# Patient Record
Sex: MALE | Race: WHITE
[De-identification: names, ages, dates, MRNs, and addresses within clinical notes are randomized per-mention and may not be internally consistent; named-entity substitution may affect disease eponyms.]

---

## 2018-02-16 ENCOUNTER — HOSPITAL ENCOUNTER (INPATIENT)
Dept: HOSPITAL 74 - YASAS | Age: 58
LOS: 3 days | Discharge: LEFT BEFORE DISCHARGE | DRG: 894 | End: 2018-02-19
Attending: INTERNAL MEDICINE | Admitting: INTERNAL MEDICINE
Payer: COMMERCIAL

## 2018-02-16 VITALS — BODY MASS INDEX: 18.4 KG/M2

## 2018-02-16 DIAGNOSIS — R63.6: ICD-10-CM

## 2018-02-16 DIAGNOSIS — Z21: ICD-10-CM

## 2018-02-16 DIAGNOSIS — F11.20: ICD-10-CM

## 2018-02-16 DIAGNOSIS — F10.230: Primary | ICD-10-CM

## 2018-02-16 DIAGNOSIS — B18.2: ICD-10-CM

## 2018-02-16 DIAGNOSIS — F12.20: ICD-10-CM

## 2018-02-16 DIAGNOSIS — F17.210: ICD-10-CM

## 2018-02-16 LAB
APPEARANCE UR: CLEAR
BILIRUB UR STRIP.AUTO-MCNC: NEGATIVE MG/DL
COLOR UR: YELLOW
KETONES UR QL STRIP: NEGATIVE
LEUKOCYTE ESTERASE UR QL STRIP.AUTO: NEGATIVE
NITRITE UR QL STRIP: NEGATIVE
PH UR: 5 [PH] (ref 5–8)
PROT UR QL STRIP: NEGATIVE
PROT UR QL STRIP: NEGATIVE
RBC # UR STRIP: NEGATIVE /UL
SP GR UR: 1.02 (ref 1–1.03)
UROBILINOGEN UR STRIP-MCNC: NEGATIVE MG/DL (ref 0.2–1)

## 2018-02-16 PROCEDURE — HZ2ZZZZ DETOXIFICATION SERVICES FOR SUBSTANCE ABUSE TREATMENT: ICD-10-PCS | Performed by: SURGERY

## 2018-02-16 RX ADMIN — Medication SCH MG: at 22:13

## 2018-02-16 RX ADMIN — HYDROXYZINE PAMOATE PRN MG: 50 CAPSULE ORAL at 22:16

## 2018-02-16 NOTE — HP
CIWA Score





- CIWA Score


Nausea/Vomitin-Mild Nausea/No Vomiting


Muscle Tremors: 2


Anxiety: 2


Agitation: 1-Slight > Activity


Paroxysmal Sweats: 2


Orientation: 2-Disoriented Date<2 days


Tacttile Disturbances: 1-Very Mild Itch/Numbness


Auditory Disturbances: 0-None


Visual Disturbances: 0-None


Headache: 2-Mild


CIWA-Ar Total Score: 13





Admission ROS BHS





- HPI


Chief Complaint: 





WITHDRAWAL SYMPTOMS 


Allergies/Adverse Reactions: 


 Allergies











Allergy/AdvReac Type Severity Reaction Status Date / Time


 


No Known Allergies Allergy   Verified 18 15:47











History of Present Illness: 





57 Y.O. MAN WITH A HISTORY OF ALCOHOL DEPENDENCE IS HERE DETOX.  HE REPORTS HE 

COMPLETED DETOX 6 MONTHS AGO AT [facility].  HE GOES TO [facility] MMTP AND 

REPORTS LDM ON 18 WITH 55MG OF METHADONE.  DOES NOT HAVE A SIGNIFICANT 

PERIOD OF SOBRIETY.  


Exam Limitations: No Limitations





- Ebola screening


Have you traveled outside of the country in the last 21 days: No


Have you had contact with anyone from an Ebola affected area: No


Have you been sick,other than usual withdrawal symptoms: No





- Review of Systems


Constitutional: Chills, Diaphoresis, Loss of Appetite, Unintentional Wgt. Loss


EENT: reports: Blurred Vision


Respiratory: reports: Shortness of Breath


Cardiac: reports: No Symptoms Reported


GI: reports: No Symptoms Reported


: reports: No Symptoms Reported


Musculoskeletal: reports: Back Pain


Integumentary: reports: No Symptoms Reported


Neuro: reports: Headache, Tingling


Endocrine: reports: No Symptoms Reported


Hematology: reports: No Symptoms Reported


Psychiatric: reports: Judgement Intact, Mood/Affect Appropiate


Other Systems: Reviewed and Negative





Patient History





- Patient Medical History


Hx Anemia: No


Hx Asthma: No


Hx Chronic Obstructive Pulmonary Disease (COPD): No


Hx Cancer: No


Hx Cardiac Disorders: No


Hx Congestive Heart Failure: No


Hx Hypertension: No


Hx Hypercholesterolemia: No


Hx Pacemaker: No


HX Cerebrovascular Accident: No


Hx Seizures: No


Hx Dementia: No


Hx Diabetes: No


Hx Gastrointestinal Disorders: No


Hx Liver Disease: Yes (HCV )


Hx Genitourinary Disorders: No


Hx Sexually Transmitted Disorders: No


Hx Renal Disease (ESRD): No


Hx Thyroid Disease: No


Hx Human Immunodeficiency Virus (HIV): Yes (DX )


Hx Hepatitis C: Yes (UNTREATED)


Hx Depression: No


Hx Suicide Attempt: No


Hx Bipolar Disorder: No


Hx Schizophrenia: No





- Patient Surgical History


Past Surgical History: No





- PPD History


Previous Implant?: Yes


Documented Results: Negative w/o proof


Implanted On Prior R Admission?: No


PPD to be Administered?: Yes





- Reproductive History


Patient is a Female of Child Bearing Age (11 -55 yrs old): No





- Smoking Cessation


Smoking history: Current every day smoker


Have you smoked in the past 12 months: Yes


Aproximately how many cigarettes per day: 10


Hx Chewing Tobacco Use: No


Initiated information on smoking cessation: Yes


'Breaking Loose' booklet given: 18





- Substance & Tx. History


Hx Alcohol Use: Yes


Hx Substance Use: Yes


Substance Use Type: Alcohol, Cocaine, Marijuana


Hx Substance Use Treatment: Yes (DETOX: 2017 AT [facility] )





- Substances Abused


  ** Alcohol


Route: Oral


Frequency: Daily


Amount used: 1 PINT VODKA


Age of first use: 12


Date of Last Use: 18





  ** Cocaine


Route: Injection


Frequency: Daily


Amount used: $20-40


Age of first use: 19


Date of Last Use: 02/15/18





  ** Marijuana/Hashish


Route: Smoking


Frequency: Daily


Amount used: $5


Age of first use: 12


Date of Last Use: 02/15/18





Family Disease History





- Family Disease History


Family Disease History: Diabetes: Mother, Heart Disease: Father





Admission Physical Exam BHS





- Vital Signs


Vital Signs: 


 Vital Signs - 24 hr











  18





  14:16


 


Temperature 96 F L


 


Pulse Rate 66


 


Respiratory 20





Rate 


 


Blood Pressure 132/74














- Physical


General Appearance: Yes: Thin, Sweating, Anxious


HEENTM: Yes: Hearing grossly Normal, Normal ENT Inspection, Normocephalic, 

Normal Voice


Respiratory: Yes: Chest Non-Tender, Lungs Clear, Normal Breath Sounds, No 

Respiratory Distress, No Accessory Muscle Use


Neck: Yes: No masses,lesions,Nodules, Trachea in good position


Breast: Yes: Breast Exam Deferred


Cardiology: Yes: Regular Rhythm, Regular Rate


Abdominal: Yes: Normal Bowel Sounds, Non Tender, Flat, Soft


Genitourinary: Yes: Within Normal Limits (NO COMPLAINTS REPORTED)


Back: Yes: Normal Inspection


Musculoskeletal: Yes: Gait Steady, Pelvis Stable


Extremities: Yes: Normal Capillary Refill, Normal Inspection, Normal Range of 

Motion, Non-Tender


Neurological: Yes: CNs II-XII NML intact, Alert, Normal Response


Integumentary: Yes: Normal Color, Dry, Warm, Track Marks


Lymphatic: Yes: Within Normal Limits





- Diagnostic


(1) HIV disease


Current Visit: Yes   Status: Chronic   





(2) Alcohol dependence with uncomplicated withdrawal


Current Visit: Yes   Status: Chronic   





(3) Nicotine dependence


Current Visit: Yes   Status: Chronic   





(4) Underweight


Current Visit: Yes   Status: Chronic   





(5) Opioid dependence on agonist therapy


Current Visit: Yes   Status: Chronic   





(6) HCV (hepatitis C virus)


Current Visit: Yes   Status: Chronic   





Cleared for Admission [facility]





- Detox or Rehab


[facility] Level of Care: Medically Managed


Detox Regimen/Protocol: Librium





BHS Breath Alcohol Content


Breath Alcohol Content: 0





Urine Drug Screen





- Results


Drug Screen Negative: No


Urine Drug Screen Results: THC-Marijuana, JEFFERY-Cocaine, MTD-Methadone

## 2018-02-17 LAB
ALBUMIN SERPL-MCNC: 3 G/DL (ref 3.4–5)
ALP SERPL-CCNC: 85 U/L (ref 45–117)
ALT SERPL-CCNC: 20 U/L (ref 12–78)
ANION GAP SERPL CALC-SCNC: 4 MMOL/L (ref 8–16)
AST SERPL-CCNC: 21 U/L (ref 15–37)
BILIRUB SERPL-MCNC: 0.4 MG/DL (ref 0.2–1)
BUN SERPL-MCNC: 17 MG/DL (ref 7–18)
CALCIUM SERPL-MCNC: 8 MG/DL (ref 8.5–10.1)
CHLORIDE SERPL-SCNC: 107 MMOL/L (ref 98–107)
CO2 SERPL-SCNC: 28 MMOL/L (ref 21–32)
CREAT SERPL-MCNC: 0.8 MG/DL (ref 0.7–1.3)
DEPRECATED RDW RBC AUTO: 13.9 % (ref 11.9–15.9)
GLUCOSE SERPL-MCNC: 98 MG/DL (ref 74–106)
HCT VFR BLD CALC: 36.1 % (ref 35.4–49)
HGB BLD-MCNC: 12.1 GM/DL (ref 11.7–16.9)
MCH RBC QN AUTO: 29.8 PG (ref 25.7–33.7)
MCHC RBC AUTO-ENTMCNC: 33.5 G/DL (ref 32–35.9)
MCV RBC: 88.8 FL (ref 80–96)
PLATELET # BLD AUTO: 140 K/MM3 (ref 134–434)
PMV BLD: 8.6 FL (ref 7.5–11.1)
POTASSIUM SERPLBLD-SCNC: 3.8 MMOL/L (ref 3.5–5.1)
PROT SERPL-MCNC: 7 G/DL (ref 6.4–8.2)
RBC # BLD AUTO: 4.07 M/MM3 (ref 4–5.6)
SODIUM SERPL-SCNC: 139 MMOL/L (ref 136–145)
WBC # BLD AUTO: 4.8 K/MM3 (ref 4–10)

## 2018-02-17 RX ADMIN — Medication SCH: at 23:02

## 2018-02-17 RX ADMIN — NICOTINE SCH MG: 14 PATCH, EXTENDED RELEASE TRANSDERMAL at 10:30

## 2018-02-17 RX ADMIN — Medication SCH TAB: at 10:30

## 2018-02-17 NOTE — PN
[facility] CIWA





- CIWA Score


Nausea/Vomitin-No Nausea/No Vomiting


Muscle Tremors: 3


Anxiety: 4-Mod. Anxious/Guarded


Agitation: 2


Paroxysmal Sweats: 3


Orientation: 2-Disoriented Date<2 days


Tacttile Disturbances: 0-None


Auditory Disturbances: 0-None


Visual Disturbances: 3-Moderate Sensitivity


Headache: 0-None Present


CIWA-Ar Total Score: 17





BHS Progress Note (SOAP)


Subjective: 





Diarrhea, Stomach Cramping, Body Aches, Tremors, Sweating.


Objective: 


PT. A & O X 2 (UNCERTAIN ABOUT CURRENT DAY / DATE). PT. OBSERVED AMBULATING ON 

UNIT. NO ACUTE DISTRESS.





18 16:53


 Vital Signs











Temperature  98.3 F   18 14:40


 


Pulse Rate  74   18 14:40


 


Respiratory Rate  20   18 14:40


 


Blood Pressure  119/71   18 14:40


 


O2 Sat by Pulse Oximetry (%)      








 Laboratory Tests











  18





  21:20 07:50 07:50


 


WBC   4.8 


 


RBC   4.07 


 


Hgb   12.1 


 


Hct   36.1 


 


MCV   88.8 


 


MCH   29.8 


 


MCHC   33.5 


 


RDW   13.9 


 


Plt Count   140 


 


MPV   8.6 


 


Sodium    139


 


Potassium    3.8


 


Chloride    107


 


Carbon Dioxide    28


 


Anion Gap    4 L


 


BUN    17


 


Creatinine    0.8


 


Creat Clearance w eGFR    > 60


 


Random Glucose    98


 


Calcium    8.0 L


 


Total Bilirubin    0.4


 


AST    21


 


ALT    20


 


Alkaline Phosphatase    85


 


Total Protein    7.0


 


Albumin    3.0 L


 


Urine Color  Yellow  


 


Urine Appearance  Clear  


 


Urine pH  5.0  


 


Ur Specific Gravity  1.018  


 


Urine Protein  Negative  


 


Urine Glucose (UA)  Negative  


 


Urine Ketones  Negative  


 


Urine Blood  Negative  


 


Urine Nitrite  Negative  


 


Urine Bilirubin  Negative  


 


Urine Urobilinogen  Negative  


 


Ur Leukocyte Esterase  Negative  


 


RPR Titer   














  18





  07:50


 


WBC 


 


RBC 


 


Hgb 


 


Hct 


 


MCV 


 


MCH 


 


MCHC 


 


RDW 


 


Plt Count 


 


MPV 


 


Sodium 


 


Potassium 


 


Chloride 


 


Carbon Dioxide 


 


Anion Gap 


 


BUN 


 


Creatinine 


 


Creat Clearance w eGFR 


 


Random Glucose 


 


Calcium 


 


Total Bilirubin 


 


AST 


 


ALT 


 


Alkaline Phosphatase 


 


Total Protein 


 


Albumin 


 


Urine Color 


 


Urine Appearance 


 


Urine pH 


 


Ur Specific Gravity 


 


Urine Protein 


 


Urine Glucose (UA) 


 


Urine Ketones 


 


Urine Blood 


 


Urine Nitrite 


 


Urine Bilirubin 


 


Urine Urobilinogen 


 


Ur Leukocyte Esterase 


 


RPR Titer  Nonreactive








LABS NOTED.


Assessment: 





18 16:54


WITHDRAWAL SYMPTOMS.


Plan: 





CONTINUE DETOX.


PRN IMMODIUM FOR DIARRHEA.


INCREASE DAILY PO FLUID INTAKE.

## 2018-02-18 RX ADMIN — Medication SCH MG: at 22:49

## 2018-02-18 RX ADMIN — LOPERAMIDE HYDROCHLORIDE PRN MG: 2 CAPSULE ORAL at 06:40

## 2018-02-18 RX ADMIN — NICOTINE SCH MG: 14 PATCH, EXTENDED RELEASE TRANSDERMAL at 10:21

## 2018-02-18 RX ADMIN — Medication SCH TAB: at 10:21

## 2018-02-18 RX ADMIN — HYDROXYZINE PAMOATE PRN MG: 50 CAPSULE ORAL at 22:52

## 2018-02-18 RX ADMIN — LOPERAMIDE HYDROCHLORIDE PRN MG: 2 CAPSULE ORAL at 15:33

## 2018-02-18 NOTE — EKG
Test Reason : 

Blood Pressure : ***/*** mmHG

Vent. Rate : 067 BPM     Atrial Rate : 067 BPM

   P-R Int : 174 ms          QRS Dur : 098 ms

    QT Int : 458 ms       P-R-T Axes : 084 108 061 degrees

   QTc Int : 483 ms

 

NORMAL SINUS RHYTHM

RIGHTWARD AXIS

PULMONARY DISEASE PATTERN

PROLONGED QT

ABNORMAL ECG

NO PREVIOUS ECGS AVAILABLE

Confirmed by RAFIQ NAVARRO MD ([id number]) on 2/18/2018 11:11:15 AM

 

Referred By:             Confirmed By:RAFIQ NAVARRO MD

## 2018-02-18 NOTE — PN
BHS Progress Note


Note: 





methadone 30 mg po x 1 dose now pending mmtp dose verification on 2/19/18.


utox + mtd

## 2018-02-18 NOTE — PN
BHS Progress Note (SOAP)


Subjective: 





ALERT,IRRITABLE,ANXIOUS,INTERRUPTED SLEEP


Objective: 





02/18/18 16:25


 Vital Signs











Temperature  97.3 F L  02/18/18 13:49


 


Pulse Rate  77   02/18/18 13:49


 


Respiratory Rate  18   02/18/18 13:49


 


Blood Pressure  128/73   02/18/18 13:49


 


O2 Sat by Pulse Oximetry (%)      











Assessment: 





02/18/18 16:25


WITHDRAWAL SYMPTOM


Plan: 





CONTINUE DETOX,PATIENT STATED HE IS ON MMTP,NO VERIFICATION,TO VERIFY PATIENT 

METHADONE PROGRAM IN AM

## 2018-02-19 VITALS — DIASTOLIC BLOOD PRESSURE: 71 MMHG | HEART RATE: 52 BPM | TEMPERATURE: 97 F | SYSTOLIC BLOOD PRESSURE: 107 MMHG

## 2018-02-19 RX ADMIN — Medication SCH: at 11:10

## 2018-02-19 RX ADMIN — NICOTINE SCH: 14 PATCH, EXTENDED RELEASE TRANSDERMAL at 11:10

## 2018-02-19 NOTE — DS
BHS Detox Discharge Summary


Admission Date: 


02/16/18





Discharge Date: 02/19/18





- History


Present History: Alcohol Dependence, Opioid Dependence, MMTP


Additional Comments: 





PATIENT DOES NOT WISH TO STAY TO COMPLETE DETOX REGIMEN. RISKS OF LEAVING DETOX 

UNIT AGAINST MEDICAL ADVICE AND PRIOR TO COMPLETION OF DETOX REGIMEN EXPLAINED 

TO PATIENT. PATIENT ADVISED TO GO IMMEDIATELY TO NEAREST ER SHOULD ANY 

INTOLERABLE DETOX SYMPTOMS DEVELOP AT ANY TIME. PATIENT LEFT DETOX UNIT IN 

STABLE MEDICAL CONDITION.


Pertinent Past History: 





MMTP, Nicotine Dependence, Underweight, Hep C, HIV.





- Physical Exam Results


Vital Signs: 


 Vital Signs











Temperature  98.6 F   02/19/18 14:08


 


Pulse Rate  74   02/19/18 14:08


 


Respiratory Rate  16   02/19/18 14:08


 


Blood Pressure  119/78   02/19/18 14:08


 


O2 Sat by Pulse Oximetry (%)      











Pertinent Admission Physical Exam Findings: 





WITHDRAWAL SYMPTOMS.





 Laboratory Tests











  02/16/18 02/17/18 02/17/18





  21:20 07:50 07:50


 


WBC   4.8 


 


RBC   4.07 


 


Hgb   12.1 


 


Hct   36.1 


 


MCV   88.8 


 


MCH   29.8 


 


MCHC   33.5 


 


RDW   13.9 


 


Plt Count   140 


 


MPV   8.6 


 


Sodium    139


 


Potassium    3.8


 


Chloride    107


 


Carbon Dioxide    28


 


Anion Gap    4 L


 


BUN    17


 


Creatinine    0.8


 


Creat Clearance w eGFR    > 60


 


Random Glucose    98


 


Calcium    8.0 L


 


Total Bilirubin    0.4


 


AST    21


 


ALT    20


 


Alkaline Phosphatase    85


 


Total Protein    7.0


 


Albumin    3.0 L


 


Urine Color  Yellow  


 


Urine Appearance  Clear  


 


Urine pH  5.0  


 


Ur Specific Gravity  1.018  


 


Urine Protein  Negative  


 


Urine Glucose (UA)  Negative  


 


Urine Ketones  Negative  


 


Urine Blood  Negative  


 


Urine Nitrite  Negative  


 


Urine Bilirubin  Negative  


 


Urine Urobilinogen  Negative  


 


Ur Leukocyte Esterase  Negative  


 


RPR Titer   














  02/17/18





  07:50


 


WBC 


 


RBC 


 


Hgb 


 


Hct 


 


MCV 


 


MCH 


 


MCHC 


 


RDW 


 


Plt Count 


 


MPV 


 


Sodium 


 


Potassium 


 


Chloride 


 


Carbon Dioxide 


 


Anion Gap 


 


BUN 


 


Creatinine 


 


Creat Clearance w eGFR 


 


Random Glucose 


 


Calcium 


 


Total Bilirubin 


 


AST 


 


ALT 


 


Alkaline Phosphatase 


 


Total Protein 


 


Albumin 


 


Urine Color 


 


Urine Appearance 


 


Urine pH 


 


Ur Specific Gravity 


 


Urine Protein 


 


Urine Glucose (UA) 


 


Urine Ketones 


 


Urine Blood 


 


Urine Nitrite 


 


Urine Bilirubin 


 


Urine Urobilinogen 


 


Ur Leukocyte Esterase 


 


RPR Titer  Nonreactive








LABS NOTED.





- Medication


Discharge Medications: 


Ambulatory Orders





NK [No Known Home Medication]  02/16/18 











- Diagnosis


(1) Alcohol dependence with uncomplicated withdrawal


Current Visit: Yes   Status: Acute   





(2) HCV (hepatitis C virus)


Current Visit: Yes   Status: Chronic   


Qualifiers: 


   Viral hepatitis chronicity: chronic   Hepatic coma status: without hepatic 

coma   Qualified Code(s): B18.2 - Chronic viral hepatitis C   





(3) HIV disease


Current Visit: Yes   Status: Chronic   





(4) Nicotine dependence


Current Visit: Yes   Status: Chronic   


Qualifiers: 


   Nicotine product type: cigarettes   Substance use status: uncomplicated   

Qualified Code(s): F17.210 - Nicotine dependence, cigarettes, uncomplicated   





(5) Opioid dependence on agonist therapy


Current Visit: Yes   Status: Chronic   





(6) Underweight


Current Visit: Yes   Status: Chronic   





- AMA


Did Patient Leave Against Medical Advice: Yes (PATIENT DOES NOT WISH TO STAY TO 

COMPLETE DETOX REGIMEN.)